# Patient Record
Sex: MALE | Race: WHITE | NOT HISPANIC OR LATINO | Employment: OTHER | URBAN - METROPOLITAN AREA
[De-identification: names, ages, dates, MRNs, and addresses within clinical notes are randomized per-mention and may not be internally consistent; named-entity substitution may affect disease eponyms.]

---

## 2019-08-21 PROBLEM — R94.39 ABNORMAL STRESS ECG: Status: ACTIVE | Noted: 2019-08-21

## 2020-06-08 ENCOUNTER — CLINICAL SUPPORT (OUTPATIENT)
Dept: URGENT CARE | Facility: CLINIC | Age: 65
End: 2020-06-08
Payer: MEDICARE

## 2020-06-08 DIAGNOSIS — R91.1 LUNG NODULE: ICD-10-CM

## 2020-06-08 PROCEDURE — U0003 INFECTIOUS AGENT DETECTION BY NUCLEIC ACID (DNA OR RNA); SEVERE ACUTE RESPIRATORY SYNDROME CORONAVIRUS 2 (SARS-COV-2) (CORONAVIRUS DISEASE [COVID-19]), AMPLIFIED PROBE TECHNIQUE, MAKING USE OF HIGH THROUGHPUT TECHNOLOGIES AS DESCRIBED BY CMS-2020-01-R: HCPCS

## 2020-06-09 LAB — SARS-COV-2 RNA RESP QL NAA+PROBE: NOT DETECTED

## 2020-06-11 PROBLEM — R91.1 LUNG NODULE: Status: ACTIVE | Noted: 2020-06-11

## 2020-07-01 PROBLEM — J44.9 COPD (CHRONIC OBSTRUCTIVE PULMONARY DISEASE): Status: ACTIVE | Noted: 2020-07-01

## 2020-09-24 PROBLEM — J44.9 COPD (CHRONIC OBSTRUCTIVE PULMONARY DISEASE): Status: RESOLVED | Noted: 2020-07-01 | Resolved: 2020-09-24

## 2020-09-24 PROBLEM — C34.12 MALIGNANT NEOPLASM OF UPPER LOBE OF LEFT LUNG: Status: ACTIVE | Noted: 2020-09-24

## 2020-09-24 PROBLEM — J96.11 CHRONIC RESPIRATORY FAILURE WITH HYPOXIA: Status: ACTIVE | Noted: 2020-09-24

## 2020-12-22 ENCOUNTER — TELEPHONE (OUTPATIENT)
Dept: HEPATOLOGY | Facility: CLINIC | Age: 65
End: 2020-12-22

## 2020-12-22 NOTE — TELEPHONE ENCOUNTER
----- Message from Zita Smith sent at 12/22/2020  9:33 AM CST -----  Regarding: appt access  Contact: trey kang/ Dr. Green office  Trey kang/ Dr. Green office ph# [phone], calling to schedule patient an appt with Dr. Scheuermann.  Please call her back to schedule.    Reason:  untreated yet current Hep C infection

## 2020-12-22 NOTE — TELEPHONE ENCOUNTER
Attempt made to reach Milka with Dr. Green office, unsuccessful. Voicemail left asking that she contacts us for scheduling of pt. Pt clinicals can be found in care everywhere.

## 2021-06-09 PROBLEM — C34.12 PRIMARY MALIGNANT NEOPLASM OF LEFT UPPER LOBE OF LUNG: Status: ACTIVE | Noted: 2021-06-09

## 2021-06-14 PROBLEM — K59.04 FUNCTIONAL CONSTIPATION: Status: ACTIVE | Noted: 2021-06-14

## 2021-06-14 PROBLEM — J95.811 POSTPROCEDURAL PNEUMOTHORAX: Status: ACTIVE | Noted: 2021-06-14

## 2021-06-14 PROBLEM — J90 PLEURAL EFFUSION: Status: ACTIVE | Noted: 2021-06-14

## 2021-06-14 PROBLEM — Z71.89 ACP (ADVANCE CARE PLANNING): Status: ACTIVE | Noted: 2021-06-14

## 2021-06-14 PROBLEM — Z72.0 TOBACCO ABUSE: Status: ACTIVE | Noted: 2021-06-14

## 2021-06-14 PROBLEM — J94.2 HEMOTHORAX ON RIGHT: Status: ACTIVE | Noted: 2021-06-14

## 2021-07-04 PROBLEM — I50.32 CHRONIC DIASTOLIC HEART FAILURE: Status: ACTIVE | Noted: 2021-07-04

## 2021-07-04 PROBLEM — G89.29 CHRONIC PAIN: Status: ACTIVE | Noted: 2021-07-04

## 2021-07-04 PROBLEM — J96.21 ACUTE ON CHRONIC RESPIRATORY FAILURE WITH HYPOXIA AND HYPERCAPNIA: Status: ACTIVE | Noted: 2021-07-04

## 2021-07-04 PROBLEM — J96.22 ACUTE ON CHRONIC RESPIRATORY FAILURE WITH HYPOXIA AND HYPERCAPNIA: Status: ACTIVE | Noted: 2021-07-04

## 2021-07-04 PROBLEM — I48.19 PERSISTENT ATRIAL FIBRILLATION: Status: ACTIVE | Noted: 2021-07-04

## 2021-07-04 PROBLEM — Z79.4 TYPE 2 DIABETES MELLITUS WITHOUT COMPLICATION, WITH LONG-TERM CURRENT USE OF INSULIN: Status: ACTIVE | Noted: 2021-07-04

## 2021-07-04 PROBLEM — E11.9 TYPE 2 DIABETES MELLITUS WITHOUT COMPLICATION, WITH LONG-TERM CURRENT USE OF INSULIN: Status: ACTIVE | Noted: 2021-07-04

## 2021-07-30 PROBLEM — C34.80 CANCER OF TRACHEA, BRONCHUS, AND LUNG: Status: ACTIVE | Noted: 2021-07-30

## 2021-07-30 PROBLEM — C33 CANCER OF TRACHEA, BRONCHUS, AND LUNG: Status: ACTIVE | Noted: 2021-07-30

## 2021-11-20 PROBLEM — J96.20 ACUTE ON CHRONIC RESPIRATORY FAILURE: Status: ACTIVE | Noted: 2021-07-04

## 2021-11-20 PROBLEM — E66.09 CLASS 2 OBESITY DUE TO EXCESS CALORIES IN ADULT: Status: ACTIVE | Noted: 2021-11-20

## 2022-01-19 ENCOUNTER — LAB VISIT (OUTPATIENT)
Dept: LAB | Facility: HOSPITAL | Age: 67
End: 2022-01-19
Attending: INTERNAL MEDICINE
Payer: MEDICARE

## 2022-01-19 DIAGNOSIS — C34.12 MALIGNANT NEOPLASM OF UPPER LOBE OF LEFT LUNG: ICD-10-CM

## 2022-01-19 DIAGNOSIS — J96.21 ACUTE ON CHRONIC RESPIRATORY FAILURE WITH HYPOXIA AND HYPERCAPNIA: ICD-10-CM

## 2022-01-19 DIAGNOSIS — J96.22 ACUTE ON CHRONIC RESPIRATORY FAILURE WITH HYPOXIA AND HYPERCAPNIA: ICD-10-CM

## 2022-01-19 DIAGNOSIS — C34.12 PRIMARY MALIGNANT NEOPLASM OF LEFT UPPER LOBE OF LUNG: ICD-10-CM

## 2022-01-19 LAB
ALBUMIN SERPL BCP-MCNC: 3.4 G/DL (ref 3.5–5.2)
ALP SERPL-CCNC: 73 U/L (ref 55–135)
ALT SERPL W/O P-5'-P-CCNC: 14 U/L (ref 10–44)
ANION GAP SERPL CALC-SCNC: 9 MMOL/L (ref 8–16)
AST SERPL-CCNC: 13 U/L (ref 10–40)
BASOPHILS # BLD AUTO: 0.05 K/UL (ref 0–0.2)
BASOPHILS NFR BLD: 0.6 % (ref 0–1.9)
BILIRUB SERPL-MCNC: 0.4 MG/DL (ref 0.1–1)
BUN SERPL-MCNC: 15 MG/DL (ref 8–23)
CALCIUM SERPL-MCNC: 9.5 MG/DL (ref 8.7–10.5)
CHLORIDE SERPL-SCNC: 95 MMOL/L (ref 95–110)
CO2 SERPL-SCNC: 37 MMOL/L (ref 23–29)
CREAT SERPL-MCNC: 0.8 MG/DL (ref 0.5–1.4)
DIFFERENTIAL METHOD: ABNORMAL
EOSINOPHIL # BLD AUTO: 0.5 K/UL (ref 0–0.5)
EOSINOPHIL NFR BLD: 6 % (ref 0–8)
ERYTHROCYTE [DISTWIDTH] IN BLOOD BY AUTOMATED COUNT: 13.6 % (ref 11.5–14.5)
EST. GFR  (AFRICAN AMERICAN): >60 ML/MIN/1.73 M^2
EST. GFR  (NON AFRICAN AMERICAN): >60 ML/MIN/1.73 M^2
GLUCOSE SERPL-MCNC: 118 MG/DL (ref 70–110)
HCT VFR BLD AUTO: 39 % (ref 40–54)
HGB BLD-MCNC: 12.3 G/DL (ref 14–18)
IMM GRANULOCYTES # BLD AUTO: 0.01 K/UL (ref 0–0.04)
IMM GRANULOCYTES NFR BLD AUTO: 0.1 % (ref 0–0.5)
LYMPHOCYTES # BLD AUTO: 2.3 K/UL (ref 1–4.8)
LYMPHOCYTES NFR BLD: 28.3 % (ref 18–48)
MCH RBC QN AUTO: 29.2 PG (ref 27–31)
MCHC RBC AUTO-ENTMCNC: 31.5 G/DL (ref 32–36)
MCV RBC AUTO: 93 FL (ref 82–98)
MONOCYTES # BLD AUTO: 0.8 K/UL (ref 0.3–1)
MONOCYTES NFR BLD: 9.5 % (ref 4–15)
NEUTROPHILS # BLD AUTO: 4.4 K/UL (ref 1.8–7.7)
NEUTROPHILS NFR BLD: 55.5 % (ref 38–73)
NRBC BLD-RTO: 0 /100 WBC
PLATELET # BLD AUTO: 170 K/UL (ref 150–450)
PMV BLD AUTO: 10.6 FL (ref 9.2–12.9)
POTASSIUM SERPL-SCNC: 4 MMOL/L (ref 3.5–5.1)
PROT SERPL-MCNC: 7.4 G/DL (ref 6–8.4)
RBC # BLD AUTO: 4.21 M/UL (ref 4.6–6.2)
SODIUM SERPL-SCNC: 141 MMOL/L (ref 136–145)
WBC # BLD AUTO: 7.96 K/UL (ref 3.9–12.7)

## 2022-01-19 PROCEDURE — 36415 COLL VENOUS BLD VENIPUNCTURE: CPT | Mod: PN | Performed by: NURSE PRACTITIONER

## 2022-01-19 PROCEDURE — 85025 COMPLETE CBC W/AUTO DIFF WBC: CPT | Mod: PN | Performed by: NURSE PRACTITIONER

## 2022-01-19 PROCEDURE — 80053 COMPREHEN METABOLIC PANEL: CPT | Mod: PN | Performed by: NURSE PRACTITIONER

## 2022-03-16 PROBLEM — R94.39 ABNORMAL STRESS ECG: Status: RESOLVED | Noted: 2019-08-21 | Resolved: 2022-03-16

## 2022-06-21 PROBLEM — D69.6 THROMBOCYTOPENIA: Status: ACTIVE | Noted: 2022-06-21

## 2022-06-21 PROBLEM — E87.8 ELECTROLYTE ABNORMALITY: Status: ACTIVE | Noted: 2022-06-21

## 2022-07-29 PROBLEM — G93.41 ENCEPHALOPATHY, METABOLIC: Status: RESOLVED | Noted: 2022-07-29 | Resolved: 2022-07-29

## 2022-07-29 PROBLEM — I50.32 CHRONIC DIASTOLIC HEART FAILURE: Status: RESOLVED | Noted: 2021-07-04 | Resolved: 2022-07-29

## 2022-07-29 PROBLEM — I48.20 ATRIAL FIBRILLATION, CHRONIC: Status: ACTIVE | Noted: 2022-07-29

## 2022-07-29 PROBLEM — G93.41 ENCEPHALOPATHY, METABOLIC: Status: ACTIVE | Noted: 2022-07-29

## 2022-09-07 PROBLEM — Z91.199 MEDICALLY NONCOMPLIANT: Status: ACTIVE | Noted: 2022-09-07

## 2022-12-12 PROBLEM — J96.20 ACUTE ON CHRONIC RESPIRATORY FAILURE: Status: RESOLVED | Noted: 2021-07-04 | Resolved: 2022-12-12

## 2023-02-14 PROBLEM — I50.21 ACUTE SYSTOLIC HEART FAILURE: Status: ACTIVE | Noted: 2023-02-14

## 2023-02-15 PROBLEM — J90 CHRONIC BILATERAL PLEURAL EFFUSIONS: Status: ACTIVE | Noted: 2023-02-15

## 2023-02-15 PROBLEM — I42.9 CARDIOMYOPATHY: Status: ACTIVE | Noted: 2023-02-15

## 2023-02-15 PROBLEM — I48.20 CHRONIC ATRIAL FIBRILLATION: Status: ACTIVE | Noted: 2021-07-04

## 2023-02-17 PROBLEM — Z72.0 TOBACCO ABUSE: Status: RESOLVED | Noted: 2021-06-14 | Resolved: 2023-02-17

## 2023-02-17 PROBLEM — I42.9 CARDIOMYOPATHY: Status: RESOLVED | Noted: 2023-02-15 | Resolved: 2023-02-17

## 2023-02-17 PROBLEM — I48.20 ATRIAL FIBRILLATION, CHRONIC: Status: RESOLVED | Noted: 2022-07-29 | Resolved: 2023-02-17

## 2023-02-17 PROBLEM — C34.12 PRIMARY MALIGNANT NEOPLASM OF LEFT UPPER LOBE OF LUNG: Status: RESOLVED | Noted: 2021-06-09 | Resolved: 2023-02-17

## 2023-02-17 PROBLEM — J96.11 CHRONIC RESPIRATORY FAILURE WITH HYPOXIA: Status: RESOLVED | Noted: 2020-09-24 | Resolved: 2023-02-17

## 2023-02-17 PROBLEM — I50.23 ACUTE ON CHRONIC SYSTOLIC CHF (CONGESTIVE HEART FAILURE): Status: ACTIVE | Noted: 2023-02-17

## 2023-02-17 PROBLEM — J96.20 ACUTE ON CHRONIC RESPIRATORY FAILURE: Status: ACTIVE | Noted: 2023-02-14

## 2023-02-17 PROBLEM — J90 CHRONIC BILATERAL PLEURAL EFFUSIONS: Status: RESOLVED | Noted: 2023-02-15 | Resolved: 2023-02-17

## 2023-03-21 PROBLEM — I42.8 CARDIOMYOPATHY, NONISCHEMIC: Status: ACTIVE | Noted: 2023-02-15

## 2023-03-23 PROBLEM — E66.01 CLASS 2 SEVERE OBESITY DUE TO EXCESS CALORIES WITH SERIOUS COMORBIDITY AND BODY MASS INDEX (BMI) OF 35.0 TO 35.9 IN ADULT: Status: ACTIVE | Noted: 2021-11-20

## 2023-05-22 PROBLEM — J96.20 ACUTE ON CHRONIC RESPIRATORY FAILURE: Status: RESOLVED | Noted: 2023-02-14 | Resolved: 2023-05-22

## 2023-11-06 PROBLEM — J96.11 CHRONIC RESPIRATORY FAILURE WITH HYPOXIA: Status: RESOLVED | Noted: 2020-09-24 | Resolved: 2023-11-06

## 2023-11-15 PROBLEM — J18.9 PNEUMONIA OF LEFT LOWER LOBE DUE TO INFECTIOUS ORGANISM: Status: ACTIVE | Noted: 2023-11-15

## 2023-11-17 PROBLEM — R07.89 OTHER CHEST PAIN: Status: ACTIVE | Noted: 2023-11-17

## 2023-11-17 PROBLEM — N17.9 AKI (ACUTE KIDNEY INJURY): Status: ACTIVE | Noted: 2023-11-17

## 2023-11-18 PROBLEM — I47.29 NONSUSTAINED PAROXYSMAL VENTRICULAR TACHYCARDIA: Status: ACTIVE | Noted: 2023-11-18

## 2023-11-18 PROBLEM — K59.00 CONSTIPATION: Status: ACTIVE | Noted: 2023-11-18

## 2023-11-19 PROBLEM — F41.9 ANXIETY: Status: ACTIVE | Noted: 2023-11-19

## 2023-11-20 PROBLEM — I47.29 NONSUSTAINED PAROXYSMAL VENTRICULAR TACHYCARDIA: Status: RESOLVED | Noted: 2023-11-18 | Resolved: 2023-11-20

## 2023-11-24 PROBLEM — I42.8 CARDIOMYOPATHY, NONISCHEMIC: Status: RESOLVED | Noted: 2023-02-15 | Resolved: 2023-11-24

## 2023-11-24 PROBLEM — F41.9 ANXIETY: Status: RESOLVED | Noted: 2023-11-19 | Resolved: 2023-11-24

## 2023-11-24 PROBLEM — Z71.89 ACP (ADVANCE CARE PLANNING): Status: RESOLVED | Noted: 2021-06-14 | Resolved: 2023-11-24

## 2023-11-24 PROBLEM — K59.00 CONSTIPATION: Status: RESOLVED | Noted: 2023-11-18 | Resolved: 2023-11-24

## 2023-11-24 PROBLEM — R07.89 OTHER CHEST PAIN: Status: RESOLVED | Noted: 2023-11-17 | Resolved: 2023-11-24

## 2024-02-19 PROBLEM — J18.9 PNEUMONIA OF LEFT LOWER LOBE DUE TO INFECTIOUS ORGANISM: Status: RESOLVED | Noted: 2023-11-15 | Resolved: 2024-02-19

## 2024-02-19 PROBLEM — N17.9 AKI (ACUTE KIDNEY INJURY): Status: RESOLVED | Noted: 2023-11-17 | Resolved: 2024-02-19
